# Patient Record
Sex: MALE | URBAN - METROPOLITAN AREA
[De-identification: names, ages, dates, MRNs, and addresses within clinical notes are randomized per-mention and may not be internally consistent; named-entity substitution may affect disease eponyms.]

---

## 2023-09-15 ENCOUNTER — EMERGENCY (EMERGENCY)
Facility: HOSPITAL | Age: 28
LOS: 1 days | Discharge: HOME | End: 2023-09-15
Admitting: EMERGENCY MEDICINE
Payer: COMMERCIAL

## 2023-09-15 VITALS
RESPIRATION RATE: 16 BRPM | HEART RATE: 89 BPM | SYSTOLIC BLOOD PRESSURE: 137 MMHG | OXYGEN SATURATION: 97 % | DIASTOLIC BLOOD PRESSURE: 72 MMHG | TEMPERATURE: 97 F

## 2023-09-15 DIAGNOSIS — R41.82 ALTERED MENTAL STATUS, UNSPECIFIED: ICD-10-CM

## 2023-09-15 DIAGNOSIS — F10.120 ALCOHOL ABUSE WITH INTOXICATION, UNCOMPLICATED: ICD-10-CM

## 2023-09-15 PROCEDURE — 99053 MED SERV 10PM-8AM 24 HR FAC: CPT

## 2023-09-15 PROCEDURE — 99283 EMERGENCY DEPT VISIT LOW MDM: CPT

## 2023-09-15 NOTE — ED PROVIDER NOTE - PATIENT PORTAL LINK FT
You can access the FollowMyHealth Patient Portal offered by [facility] by registering at the following website: [email or web address]. By joining [email or web address] FollowMyHealth portal, you will also be able to view your health information using other applications (apps) compatible with our system.

## 2023-09-15 NOTE — ED ADULT NURSE NOTE - NSFALLUNIVINTERV_ED_ALL_ED
Bed/Stretcher in lowest position, wheels locked, appropriate side rails in place/Call bell, personal items and telephone in reach/Instruct patient to call for assistance before getting out of bed/chair/stretcher/Non-slip footwear applied when patient is off stretcher/[place] to call system/Physically safe environment - no spills, clutter or unnecessary equipment/Purposeful proactive rounding/Room/bathroom lighting operational, light cord in reach

## 2023-09-15 NOTE — ED PROVIDER NOTE - OBJECTIVE STATEMENT
26 yo m bibems for AMS, possibly 2/2 substance use, no trauma reported.  limited history 2/2 mental status.    I have reviewed available current nursing and previous documentation of past medical, surgical, family, and/or social history.

## 2023-09-15 NOTE — ED ADULT NURSE NOTE - OBJECTIVE STATEMENT
Patient is a 26 y/o M c/o altered mental status. patient bibems for altered mental status. patient admits to etoh usage tonight. patient has no obvious injuries to face or body.

## 2023-09-15 NOTE — ED PROVIDER NOTE - PROGRESS NOTE DETAILS
pt is clinically sober with steady gait reports fall but head is atraumatic and pt is neurovascular intact

## 2023-09-15 NOTE — ED ADULT TRIAGE NOTE - CHIEF COMPLAINT QUOTE
Pt BIBA for ams. Pt admits to alcohol use, denies drug use. Pt admits to trip and fall, hitting head. Pt denies daily use of blood thinners. No obvious bruising or injuries noted.

## 2023-09-15 NOTE — ED PROVIDER NOTE - PHYSICAL EXAMINATION
Physical Exam    Vital Signs: I have reviewed the initial vital signs.  Constitutional: well-appearing, appears stated age  Head: grossly atraumatic, no hematoma or laceration  Cspine: no c spine ttp, full neck ROM, flexion and rotation  Cardiovascular: +S1/S2, no murmurs, regular rate, regular rhythm, well-perfused extremities  Respiratory: unlabored respiratory effort, clear to auscultation bilaterally, speaks in full sentences  Gastrointestinal: soft, non-tender abdomen, no pulsatile mass  Neurologic: awake, alert, cranial nerves II-XII grossly intact, extremities’ motor and sensory functions grossly intact, no ataxia, no dysemtria, steady gait